# Patient Record
Sex: FEMALE
[De-identification: names, ages, dates, MRNs, and addresses within clinical notes are randomized per-mention and may not be internally consistent; named-entity substitution may affect disease eponyms.]

---

## 2017-05-07 ENCOUNTER — HOSPITAL ENCOUNTER (EMERGENCY)
Dept: HOSPITAL 14 - H.ER | Age: 42
Discharge: HOME | End: 2017-05-07
Payer: COMMERCIAL

## 2017-05-07 VITALS — HEART RATE: 89 BPM | RESPIRATION RATE: 19 BRPM | OXYGEN SATURATION: 98 %

## 2017-05-07 VITALS — TEMPERATURE: 98.2 F | DIASTOLIC BLOOD PRESSURE: 93 MMHG | SYSTOLIC BLOOD PRESSURE: 135 MMHG

## 2017-05-07 DIAGNOSIS — R51: Primary | ICD-10-CM

## 2017-05-07 DIAGNOSIS — K21.9: ICD-10-CM

## 2017-05-07 DIAGNOSIS — B34.9: ICD-10-CM

## 2017-05-07 NOTE — ED PDOC
HPI:  Headache


Time Seen by Provider: 05/07/17 12:58


Chief Complaint (Nursing): Headache


Chief Complaint (Provider): Headache, throat pain, ear pain today 


History Per: Patient


History/Exam Limitations: no limitations


Onset/Duration Of Symptoms: Days


Current Symptoms Are (Timing): Still Present


Additional Complaint(s): 





Ear pain, throat pain, headache and chills sine this morning. Pt states 

headache is not the worst of her life but she was concerned because she she 

checked her BP it was elevated at home. PT states after taking aleve she feels 

much better. Pt states she has mild nausea this morning. 





Past Medical History


Vital Signs: 





 Last Vital Signs











Temp  98.2 F   05/07/17 12:36


 


Pulse  102 H  05/07/17 12:36


 


Resp  20   05/07/17 12:36


 


BP  135/93 H  05/07/17 12:36


 


Pulse Ox  100   05/07/17 12:36














- Medical History


PMH: GERD





- Surgical History


Surgical History: Cholecystectomy





- Family History


Family History: States: Unknown Family Hx





- Immunization History


Hx Tetanus Toxoid Vaccination: No


Hx Influenza Vaccination: No





- Home Medications


Home Medications: 


 Ambulatory Orders











 Medication  Instructions  Recorded


 


Acetaminophen with Codeine 1 tab PO Q6H PRN #10 tab 11/24/15





[Tylenol with Codeine No. 3 300  





mg-30 mg]  


 


Naproxen [Naprosyn] 500 mg PO BID PRN #30 tab 04/28/16


 


Albuterol HFA [Ventolin HFA 90 1 puff IH Q4 PRN #1 inh 12/16/16





mcg/actuation (8 g)]  


 


Amoxicillin/Clavulanate [Augmentin 1 tab PO Q12 #14 tab 12/16/16





875 MG-125 MG]  














- Allergies


Allergies/Adverse Reactions: 


 Allergies











Allergy/AdvReac Type Severity Reaction Status Date / Time


 


azithromycin Allergy  ANAPHYLAXIS Verified 05/07/17 12:36


 


caffeine Allergy  DIZZINESS Verified 05/07/17 12:36














Physical Exam





- Reviewed


Nursing Documentation Reviewed: Yes


Vital Signs Reviewed: Yes





- Physical Exam


Appears: Positive for: Well, Non-toxic, No Acute Distress


Head Exam: Positive for: ATRAUMATIC, NORMAL INSPECTION, NORMOCEPHALIC


Skin: Positive for: Normal Color, Warm, DRY


Eye Exam: Positive for: Normal appearance, EOMI, PERRL


ENT: Positive for: Normal ENT Inspection


Neck: Positive for: Normal, Painless ROM


Cardiovascular/Chest: Positive for: Regular Rate, Rhythm


Respiratory: Positive for: Normal Breath Sounds.  Negative for: Accessory 

Muscle Use


Gastrointestinal/Abdominal: Positive for: Normal Exam, Bowel Sounds, Soft


Back: Positive for: Normal Inspection


Extremity: Positive for: Normal ROM


Neurologic/Psych: Positive for: Alert, Oriented





- ECG


O2 Sat by Pulse Oximetry: 100





Medical Decision Making


Medical Decision Making: 





When originally asked it if is the worse headache of her life pt states no. Pt 

states that originally it was not now that she tool aleve she is feeling better.





Head CT normal. 





Prednisone for throat pain. 





Disposition





- Clinical Impression


Clinical Impression: 


 Headache, Viral illness








- Disposition


Referrals: 


[facility] [place] [Outside]


Disposition: Routine/Home


Disposition Time: 15:41


Condition: GOOD


Instructions:  Viral Syndrome (ED)


Print Language: Tajik

## 2017-05-07 NOTE — CT
PROCEDURE:  CT HEAD WITHOUT CONTRAST.



HISTORY:

headache



COMPARISON:

06/27/2014 



TECHNIQUE:

Axial computed tomography images were obtained through the head/brain 

without intravenous contrast.  



Radiation dose:



Total exam DLP = 865 mGy-cm.



This CT exam was performed using one or more of the following dose 

reduction techniques: Automated exposure control, adjustment of the 

mA and/or kV according to patient size, and/or use of iterative 

reconstruction technique.



FINDINGS:



HEMORRHAGE:

No intracranial hemorrhage. 



BRAIN:

No mass effect or edema.  No atrophy or chronic microvascular 

ischemic changes.



VENTRICLES:

Unremarkable. No hydrocephalus. 



CALVARIUM:

Unremarkable.



PARANASAL SINUSES:

Unremarkable as visualized. No significant inflammatory changes.



MASTOID AIR CELLS:

Unremarkable as visualized. No inflammatory changes.



OTHER FINDINGS:

None.



IMPRESSION:

Normal CT of the Head.

## 2017-12-29 ENCOUNTER — HOSPITAL ENCOUNTER (EMERGENCY)
Dept: HOSPITAL 14 - H.ER | Age: 42
LOS: 1 days | Discharge: HOME | End: 2017-12-30
Payer: COMMERCIAL

## 2017-12-29 VITALS — RESPIRATION RATE: 16 BRPM

## 2017-12-29 DIAGNOSIS — I10: Primary | ICD-10-CM

## 2017-12-29 DIAGNOSIS — N93.9: ICD-10-CM

## 2017-12-29 DIAGNOSIS — R51: ICD-10-CM

## 2017-12-29 DIAGNOSIS — K21.9: ICD-10-CM

## 2017-12-29 LAB
ALBUMIN SERPL-MCNC: 4.4 G/DL (ref 3.5–5)
ALBUMIN/GLOB SERPL: 1.2 {RATIO} (ref 1–2.1)
ALT SERPL-CCNC: 25 U/L (ref 9–52)
AST SERPL-CCNC: 25 U/L (ref 14–36)
BASOPHILS # BLD AUTO: 0.1 K/UL (ref 0–0.2)
BASOPHILS NFR BLD: 0.6 % (ref 0–2)
BUN SERPL-MCNC: 10 MG/DL (ref 7–17)
CALCIUM SERPL-MCNC: 9.4 MG/DL (ref 8.4–10.2)
EOSINOPHIL # BLD AUTO: 0.1 K/UL (ref 0–0.7)
EOSINOPHIL NFR BLD: 0.9 % (ref 0–4)
ERYTHROCYTE [DISTWIDTH] IN BLOOD BY AUTOMATED COUNT: 14.1 % (ref 11.5–14.5)
GFR NON-AFRICAN AMERICAN: > 60
HGB BLD-MCNC: 13.1 G/DL (ref 12–16)
LYMPHOCYTES # BLD AUTO: 1.8 K/UL (ref 1–4.3)
LYMPHOCYTES NFR BLD AUTO: 19.9 % (ref 20–40)
MCH RBC QN AUTO: 27.7 PG (ref 27–31)
MCHC RBC AUTO-ENTMCNC: 32.1 G/DL (ref 33–37)
MCV RBC AUTO: 86.4 FL (ref 81–99)
MONOCYTES # BLD: 0.8 K/UL (ref 0–0.8)
MONOCYTES NFR BLD: 8.7 % (ref 0–10)
NEUTROPHILS # BLD: 6.3 K/UL (ref 1.8–7)
NEUTROPHILS NFR BLD AUTO: 69.9 % (ref 50–75)
NRBC BLD AUTO-RTO: 0.1 % (ref 0–0)
PLATELET # BLD: 265 K/UL (ref 130–400)
PMV BLD AUTO: 8.4 FL (ref 7.2–11.7)
RBC # BLD AUTO: 4.71 MIL/UL (ref 3.8–5.2)
WBC # BLD AUTO: 9 K/UL (ref 4.8–10.8)

## 2017-12-29 NOTE — ED PDOC
HPI: Hypertension/Hypotension


Time Seen by Provider: 12/29/17 22:04


Chief Complaint (Nursing): High Blood Pressure


Chief Complaint (Provider): hypertension


History Per: Patient


History/Exam Limitations: no limitations


Onset/Duration Of Symptoms: Days (2)


Current Symptoms Are (Timing): Still Present


Associated Symptoms: Headache


Additional History Per: Patient


Additional Complaint(s): 





41 y/o female presents for evaluation of elevated blood pressure x 2 days.  

Patient notes readings as high as 145 systolic at home.  Patient also 

complaining of intermittent headaches x 1 month; states she got her menstrual 

period the same time as headache started on December 1st, and that her period 

lasted until the 18th, at that time headache resolved.  Patient states 

menstrual started again today, as well as 4/10 headache.  Denies fever, 

dizziness, extremity numbness/weakness, vision changes, nauesa/vomiting, chest 

pain, shortness of breath, palpitations, abdominal pain, leg pain/swelling.  


Patient notes being under stress as her nephew was staying with her and he was 

causing a lot of problems, other than that denies anxiety/stressors. Denies 

suicidal/homicidal ideations.





Past Medical History


Reviewed: Historical Data, Nursing Documentation, Vital Signs


Vital Signs: 


 Last Vital Signs











Temp  98.5 F   12/29/17 21:55


 


Pulse  112 H  12/29/17 21:55


 


Resp  16   12/29/17 21:55


 


BP  160/103 H  12/29/17 21:55


 


Pulse Ox  99   12/29/17 21:55














- Medical History


PMH: GERD





- Surgical History


Surgical History: Cholecystectomy





- Family History


Family History: States: Unknown Family Hx





- Immunization History


Hx Tetanus Toxoid Vaccination: No


Hx Influenza Vaccination: No





- Home Medications


Home Medications: 


 Ambulatory Orders











 Medication  Instructions  Recorded


 


Acetaminophen with Codeine 1 tab PO Q6H PRN #10 tab 11/24/15





[Tylenol with Codeine No. 3 300  





mg-30 mg]  


 


Naproxen [Naprosyn] 500 mg PO BID PRN #30 tab 04/28/16


 


Albuterol HFA [Ventolin HFA 90 1 puff IH Q4 PRN #1 inh 12/16/16





mcg/actuation (8 g)]  


 


Amoxicillin/Clavulanate [Augmentin 1 tab PO Q12 #14 tab 12/16/16





875 MG-125 MG]  














- Allergies


Allergies/Adverse Reactions: 


 Allergies











Allergy/AdvReac Type Severity Reaction Status Date / Time


 


azithromycin Allergy  ANAPHYLAXIS Verified 05/07/17 12:36


 


caffeine Allergy  DIZZINESS Verified 05/07/17 12:36


 


levofloxacin Allergy  ANAPHYLAXIS Verified 12/29/17 21:59














Review of Systems


ROS Statement: Except As Marked, All Systems Reviewed And Found Negative


Genitourinary Female: Positive for: Vaginal Bleeding


Neurological: Positive for: Headache





Physical Exam





- Reviewed


Nursing Documentation Reviewed: Yes


Vital Signs Reviewed: Yes





- Physical Exam


Appears: Positive for: Well, Non-toxic, No Acute Distress


Head Exam: Positive for: ATRAUMATIC, NORMAL INSPECTION, NORMOCEPHALIC


Skin: Positive for: Normal Color


Eye Exam: Positive for: Normal appearance, EOMI, PERRL


ENT: Positive for: Normal ENT Inspection


Cardiovascular/Chest: Positive for: Regular Rate, Rhythm


Respiratory: Positive for: Normal Breath Sounds


Gastrointestinal/Abdominal: Positive for: Normal Exam


Back: Positive for: Normal Inspection


Extremity: Positive for: Normal ROM


Neurologic/Psych: Positive for: Alert, Oriented.  Negative for: Motor/Sensory 

Deficits





- Laboratory Results


Result Diagrams: 


 12/29/17 22:40





 12/29/17 22:40





- ECG


ECG: Positive for: Viewed By Me (reviewed by ED attending)


ECG Rhythm: Positive for: Sinus Rhythm


O2 Sat by Pulse Oximetry: 99





- Progress


ED Course And Treament: 





labs, ekg, Tylenol PO





On re-eval, patient resting comfortably; states she is feeling beter.


Patient educated on findings, discharged with instructions ot follow up PMD/Gyn.


Advised Tylenol PRN headache.  


Return precautions given.





Disposition





- Clinical Impression


Clinical Impression: 


 Headache, Vaginal bleeding, abnormal, Hypertension








- Patient ED Disposition


Is Patient to be Admitted: No


Counseled Patient/Family Regarding: Studies Performed, Diagnosis, Need For 

Followup





- Disposition


Disposition: Routine/Home


Disposition Time: 01:00


Condition: IMPROVED


Instructions:  Hypertension (ED), Dysfunctional Uterine Bleeding (ED), Acute 

Headache (ED)


Forms:  [employer] (Citizen of [place])


Print Language: Kinyarwanda

## 2017-12-30 VITALS — HEART RATE: 94 BPM | SYSTOLIC BLOOD PRESSURE: 116 MMHG | DIASTOLIC BLOOD PRESSURE: 80 MMHG | TEMPERATURE: 98.1 F

## 2017-12-30 VITALS — OXYGEN SATURATION: 99 %

## 2017-12-30 NOTE — CARD
--------------- APPROVED REPORT --------------





EKG Measurement

Heart [id number]

IL [id number]

[id number]

[id number]

[id number]



<Conclusion>

Normal sinus rhythm

Normal ECG

## 2018-03-31 ENCOUNTER — HOSPITAL ENCOUNTER (EMERGENCY)
Dept: HOSPITAL 14 - H.ER | Age: 43
Discharge: HOME | End: 2018-03-31
Payer: COMMERCIAL

## 2018-03-31 VITALS — SYSTOLIC BLOOD PRESSURE: 123 MMHG | RESPIRATION RATE: 17 BRPM | DIASTOLIC BLOOD PRESSURE: 76 MMHG | TEMPERATURE: 98.1 F

## 2018-03-31 DIAGNOSIS — F43.20: ICD-10-CM

## 2018-03-31 DIAGNOSIS — R00.2: Primary | ICD-10-CM

## 2018-03-31 DIAGNOSIS — Z63.4: ICD-10-CM

## 2018-03-31 LAB
ALBUMIN SERPL-MCNC: 4.3 G/DL (ref 3.5–5)
ALBUMIN/GLOB SERPL: 1.1 {RATIO} (ref 1–2.1)
ALT SERPL-CCNC: 19 U/L (ref 9–52)
APTT BLD: 34.3 SECONDS (ref 25.6–37.1)
AST SERPL-CCNC: 30 U/L (ref 14–36)
BASOPHILS # BLD AUTO: 0.1 K/UL (ref 0–0.2)
BASOPHILS NFR BLD: 0.7 % (ref 0–2)
BNP SERPL-MCNC: 45.9 PG/ML (ref 0–450)
BUN SERPL-MCNC: 11 MG/DL (ref 7–17)
CALCIUM SERPL-MCNC: 9.5 MG/DL (ref 8.4–10.2)
EOSINOPHIL # BLD AUTO: 0.1 K/UL (ref 0–0.7)
EOSINOPHIL NFR BLD: 0.7 % (ref 0–4)
ERYTHROCYTE [DISTWIDTH] IN BLOOD BY AUTOMATED COUNT: 13.9 % (ref 11.5–14.5)
GFR NON-AFRICAN AMERICAN: > 60
HGB BLD-MCNC: 13 G/DL (ref 12–16)
INR PPP: 1.1 (ref 0.9–1.2)
LYMPHOCYTES # BLD AUTO: 1.3 K/UL (ref 1–4.3)
LYMPHOCYTES NFR BLD AUTO: 16.7 % (ref 20–40)
MCH RBC QN AUTO: 28.6 PG (ref 27–31)
MCHC RBC AUTO-ENTMCNC: 32.8 G/DL (ref 33–37)
MCV RBC AUTO: 87.2 FL (ref 81–99)
MONOCYTES # BLD: 0.7 K/UL (ref 0–0.8)
MONOCYTES NFR BLD: 8.9 % (ref 0–10)
NEUTROPHILS # BLD: 5.5 K/UL (ref 1.8–7)
NEUTROPHILS NFR BLD AUTO: 73 % (ref 50–75)
NRBC BLD AUTO-RTO: 0.1 % (ref 0–0)
PLATELET # BLD: 309 K/UL (ref 130–400)
PMV BLD AUTO: 8.6 FL (ref 7.2–11.7)
PROTHROMBIN TIME: 12 SECONDS (ref 9.8–13.1)
RBC # BLD AUTO: 4.56 MIL/UL (ref 3.8–5.2)
WBC # BLD AUTO: 7.6 K/UL (ref 4.8–10.8)

## 2018-03-31 SDOH — SOCIAL STABILITY - SOCIAL INSECURITY: DISSAPEARANCE AND DEATH OF FAMILY MEMBER: Z63.4

## 2018-03-31 NOTE — RAD
HISTORY:

SOB  



COMPARISON:

Chest radiograph dated 12/16/2016. 



FINDINGS:



LUNGS:

No active pulmonary disease.



PLEURA:

No significant pleural effusion identified, no pneumothorax apparent.



CARDIOVASCULAR:

Normal.



OSSEOUS STRUCTURES:

No significant abnormalities.



VISUALIZED UPPER ABDOMEN:

Right upper quadrant surgical clips.



OTHER FINDINGS:

None.



IMPRESSION:

No active disease.

## 2018-03-31 NOTE — ED PDOC
HPI: Chest Pain


Time Seen by Provider: 18 12:49


Chief Complaint (Nursing): Chest Pain


Chief Complaint (Provider): chest discomfort


History Per: Patient,  (indemangrace [id number])


Onset/Duration Of Symptoms: Days (2)


Current Symptoms Are (Timing): Still Present


Quality: Tightness


Associated Symptoms: denies: Nausea


Modifying Factors: None


Exacerbating Factors: None


Alleviating Factors: None


Additional Complaint(s): 





43yo female c/o chest pressure, palpitations and body aches for 2-3 days. Also 

notes feelings of sadness and loss after her brother  last week of cardiac 

arrest. She denies syncope, SOB, neck pain, or thoughts of suicide.  





Past Medical History


Reviewed: Historical Data, Nursing Documentation, Vital Signs


Vital Signs: 


 Last Vital Signs











Temp  98.1 F   18 15:38


 


Pulse  68   18 15:38


 


Resp  17   18 15:38


 


BP  123/76   18 15:38


 


Pulse Ox  100   18 15:38














- Medical History


PMH: GERD





- Surgical History


Surgical History: Cholecystectomy





- Family History


Family History: States: Unknown Family Hx





- Living Arrangements


Living Arrangements: With Family





- Social History


Current smoker - smoking cessation education provided: No





- Immunization History


Hx Tetanus Toxoid Vaccination: No


Hx Influenza Vaccination: No





- Home Medications


Home Medications: 


 Ambulatory Orders











 Medication  Instructions  Recorded


 


Acetaminophen with Codeine 1 tab PO Q6H PRN #10 tab 11/24/15





[Tylenol with Codeine No. 3 300  





mg-30 mg]  


 


Naproxen [Naprosyn] 500 mg PO BID PRN #30 tab 16


 


Albuterol HFA [Ventolin HFA 90 1 puff IH Q4 PRN #1 inh 16





mcg/actuation (8 g)]  


 


Amoxicillin/Clavulanate [Augmentin 1 tab PO Q12 #14 tab 16





875 MG-125 MG]  














- Allergies


Allergies/Adverse Reactions: 


 Allergies











Allergy/AdvReac Type Severity Reaction Status Date / Time


 


azithromycin Allergy  ANAPHYLAXIS Verified 18 12:44


 


caffeine Allergy  DIZZINESS Verified 18 12:44


 


levofloxacin Allergy  ANAPHYLAXIS Verified 18 12:44














Review of Systems


Constitutional: Negative for: Fever, Chills


Cardiovascular: Positive for: Chest Pain, Palpitations.  Negative for: Orthopnea

, Edema


Respiratory: Negative for: Cough, Shortness of Breath


Gastrointestinal: Negative for: Nausea, Vomiting


Genitourinary Female: Negative for: Dysuria, Frequency


Musculoskeletal: Negative for: Neck Pain


Skin: Negative for: Rash, Lesions


Neurological: Negative for: Weakness, Numbness, Headache, Dizziness


Psych: Positive for: Anxiety, Depression.  Negative for: Psychosis, Suicidal 

ideation, Withdrawal





Physical Exam





- Reviewed


Nursing Documentation Reviewed: Yes


Vital Signs Reviewed: Yes





- Physical Exam


Appears: Positive for: Non-toxic (tearful), No Acute Distress


Head Exam: Positive for: ATRAUMATIC, NORMAL INSPECTION, NORMOCEPHALIC


Skin: Positive for: Normal Color, Warm, DRY


Eye Exam: Positive for: EOMI, Normal appearance, PERRL


ENT: Positive for: Normal ENT Inspection


Neck: Positive for: Normal, Painless ROM


Cardiovascular/Chest: Positive for: Tachycardia.  Negative for: Irregularly 

Irregular


Respiratory: Positive for: CNT, Normal Breath Sounds


Gastrointestinal/Abdominal: Positive for: Normal Exam, Bowel Sounds, Soft


Back: Positive for: Normal Inspection


Extremity: Positive for: Normal ROM


Neurologic/Psych: Positive for: Alert, Oriented.  Negative for: Motor/Sensory 

Deficits





- Laboratory Results


Result Diagrams: 


 18 13:22





 18 13:22





- ECG


ECG: Positive for: Interpreted By Me


ECG Rhythm: Positive for: Sinus Tachycardia, Nonspecific Changes


Rate: 101


O2 Sat by Pulse Oximetry: 98


Pulse Ox Interpretation: Normal





- Radiology


X-Ray: Read By Radiologist


X-Ray Interpretation: No Acute Disease





Medical Decision Making


Medical Decision Making: 





workup for chest discomfort w low grade resting tachycardia initiated


patient refused metoprolol or anxiolytic


labs reviewed, normal Hgb, neg trop, normal BNP, normal TSH, normal DDimer





Improved in ED, HR 92 on re-eval, refused crisis eval states not depressed or 

suicidal, just sad over loss of brother.


DC from ED











Disposition





- Clinical Impression


Clinical Impression: 


 Palpitations, Grief reaction








- Patient ED Disposition


Is Patient to be Admitted: No


Counseled Patient/Family Regarding: Studies Performed, Diagnosis, Need For 

Followup





- Disposition


Referrals: 


[facility] [Outside]


Disposition: Routine/Home


Disposition Time: 14:45


Condition: STABLE


Additional Instructions: 


Return to ER for any worse or new symptoms


Followup with clinic for further testing.





Instructions:  Palpitations, Dealing With Death, Adult


Forms:  [facility] (English)


Print Language: Maori

## 2018-04-01 VITALS — HEART RATE: 101 BPM | OXYGEN SATURATION: 98 %

## 2018-04-01 NOTE — CARD
--------------- APPROVED REPORT --------------





EKG Measurement

Heart [id number]

IA 180P65

[id number]

[id number]

[id number]



<Conclusion>

Sinus tachycardia

Otherwise normal ECG